# Patient Record
Sex: FEMALE | Race: WHITE | Employment: FULL TIME | URBAN - METROPOLITAN AREA
[De-identification: names, ages, dates, MRNs, and addresses within clinical notes are randomized per-mention and may not be internally consistent; named-entity substitution may affect disease eponyms.]

---

## 2021-06-29 LAB
HBA1C MFR BLD HPLC: 4.9 %
LDL-C, EXTERNAL: 124

## 2021-11-17 ENCOUNTER — HOSPITAL ENCOUNTER (EMERGENCY)
Age: 31
Discharge: HOME | End: 2021-11-17
Attending: STUDENT IN AN ORGANIZED HEALTH CARE EDUCATION/TRAINING PROGRAM
Payer: COMMERCIAL

## 2021-11-17 VITALS
WEIGHT: 130 LBS | OXYGEN SATURATION: 100 % | RESPIRATION RATE: 16 BRPM | HEART RATE: 93 BPM | TEMPERATURE: 98 F | BODY MASS INDEX: 19.7 KG/M2 | DIASTOLIC BLOOD PRESSURE: 72 MMHG | HEIGHT: 68 IN | SYSTOLIC BLOOD PRESSURE: 115 MMHG

## 2021-11-17 DIAGNOSIS — E16.1 REACTIVE HYPOGLYCEMIA: Primary | ICD-10-CM

## 2021-11-17 LAB
ANION GAP SERPL CALC-SCNC: 3 MMOL/L (ref 5–15)
BASOPHILS # BLD: 0 K/UL (ref 0–0.1)
BASOPHILS NFR BLD: 0 % (ref 0–1)
BUN SERPL-MCNC: 16 MG/DL (ref 6–20)
BUN/CREAT SERPL: 23 (ref 12–20)
CALCIUM SERPL-MCNC: 9.3 MG/DL (ref 8.5–10.1)
CHLORIDE SERPL-SCNC: 107 MMOL/L (ref 97–108)
CO2 SERPL-SCNC: 28 MMOL/L (ref 21–32)
COMMENT, HOLDF: NORMAL
CREAT SERPL-MCNC: 0.7 MG/DL (ref 0.55–1.02)
DIFFERENTIAL METHOD BLD: ABNORMAL
EOSINOPHIL # BLD: 0.1 K/UL (ref 0–0.4)
EOSINOPHIL NFR BLD: 1 % (ref 0–7)
ERYTHROCYTE [DISTWIDTH] IN BLOOD BY AUTOMATED COUNT: 11.1 % (ref 11.5–14.5)
GLUCOSE BLD STRIP.AUTO-MCNC: 109 MG/DL (ref 65–117)
GLUCOSE SERPL-MCNC: 118 MG/DL (ref 65–100)
HCT VFR BLD AUTO: 37.5 % (ref 35–47)
HGB BLD-MCNC: 12.9 G/DL (ref 11.5–16)
IMM GRANULOCYTES # BLD AUTO: 0 K/UL (ref 0–0.04)
IMM GRANULOCYTES NFR BLD AUTO: 0 % (ref 0–0.5)
LIPASE SERPL-CCNC: 126 U/L (ref 73–393)
LYMPHOCYTES # BLD: 1.4 K/UL (ref 0.8–3.5)
LYMPHOCYTES NFR BLD: 19 % (ref 12–49)
MCH RBC QN AUTO: 32.8 PG (ref 26–34)
MCHC RBC AUTO-ENTMCNC: 34.4 G/DL (ref 30–36.5)
MCV RBC AUTO: 95.4 FL (ref 80–99)
MONOCYTES # BLD: 0.7 K/UL (ref 0–1)
MONOCYTES NFR BLD: 9 % (ref 5–13)
NEUTS SEG # BLD: 5.2 K/UL (ref 1.8–8)
NEUTS SEG NFR BLD: 71 % (ref 32–75)
NRBC # BLD: 0 K/UL (ref 0–0.01)
NRBC BLD-RTO: 0 PER 100 WBC
PLATELET # BLD AUTO: 257 K/UL (ref 150–400)
PMV BLD AUTO: 10.3 FL (ref 8.9–12.9)
POTASSIUM SERPL-SCNC: 3.8 MMOL/L (ref 3.5–5.1)
RBC # BLD AUTO: 3.93 M/UL (ref 3.8–5.2)
SAMPLES BEING HELD,HOLD: NORMAL
SERVICE CMNT-IMP: NORMAL
SODIUM SERPL-SCNC: 138 MMOL/L (ref 136–145)
WBC # BLD AUTO: 7.4 K/UL (ref 3.6–11)

## 2021-11-17 PROCEDURE — 82962 GLUCOSE BLOOD TEST: CPT

## 2021-11-17 PROCEDURE — 80048 BASIC METABOLIC PNL TOTAL CA: CPT

## 2021-11-17 PROCEDURE — 36415 COLL VENOUS BLD VENIPUNCTURE: CPT

## 2021-11-17 PROCEDURE — 83690 ASSAY OF LIPASE: CPT

## 2021-11-17 PROCEDURE — 85025 COMPLETE CBC W/AUTO DIFF WBC: CPT

## 2021-11-17 PROCEDURE — 99284 EMERGENCY DEPT VISIT MOD MDM: CPT

## 2021-11-18 NOTE — ED PROVIDER NOTES
57-year-old female presents to the ED with hypoglycemia. She reports that she was recently diagnosed with reactive hypoglycemia by Dr. Eugenia Gomez, an endocrinologist.  He had previously been diagnosed with generalized anxiety and panic attacks, but her therapist had felt like there is something medical underlying going on and referred her to the endocrinologist who gave her this diagnosis. She reports her symptoms when her blood sugar is low present like anxiety, and teeth chattering. She was wearing a blood glucose monitor for a couple of months and it seemed that her blood sugars were stabilizing, but she had started feeling \"not right\" and recently decided to start wearing the monitor again. She reports that her blood sugars seem to be doing fine but last night her blood sugars were seeming to drop, staying near the 70s despite eating full meals. This continued into the night, and today where she reported she \"just did not feel right. Prior to coming into the ED, she reports her blood sugar had dropped into the 50s and did not seem to be responsive to eating peanut butter or honey. Shortly thereafter she felt \"my knees buckle\" and decided to come in to the ED. Denies any chest pain, shortness of breath, diaphoresis, vision changes, headache, dizziness, or syncope. No nausea vomiting or diarrhea. PMH: Reactive hypoglycemia  PSx: ear tubes  Social: no tobacco use, EtOH, or drug use    The history is provided by the patient.         Past Medical History:   Diagnosis Date    Depression     anxiety    Fracture of coccyx ([address]     (age 13)   [facility] Reactive hypoglycemia        Past Surgical History:   Procedure Laterality Date    HX HEENT      ear tubes (age 1)         Family History:   Problem Relation Age of Onset    Anxiety Sister     Anxiety Brother        Social History     Socioeconomic History    Marital status: SINGLE     Spouse name: Not on file    Number of children: Not on file    Years of education: Not on file    Highest education level: Not on file   Occupational History    Not on file   Tobacco Use    Smoking status: Never Smoker    Smokeless tobacco: Not on file   Substance and Sexual Activity    Alcohol use: No    Drug use: Not on file    Sexual activity: Not on file   Other Topics Concern    Not on file   Social History Narrative    Not on file     Social Determinants of Health     Financial Resource Strain:     Difficulty of Paying Living Expenses: Not on file   Food Insecurity:     Worried About Running Out of Food in the Last Year: Not on file    Pradeep of Food in the Last Year: Not on file   Transportation Needs:     Lack of Transportation (Medical): Not on file    Lack of Transportation (Non-Medical): Not on file   Physical Activity:     Days of Exercise per Week: Not on file    Minutes of Exercise per Session: Not on file   Stress:     Feeling of Stress : Not on file   Social Connections:     Frequency of Communication with Friends and Family: Not on file    Frequency of Social Gatherings with Friends and Family: Not on file    Attends Sabianist Services: Not on file    Active Member of [address] [place] [address] or Organizations: Not on file    Attends Club or Organization Meetings: Not on file    Marital Status: Not on file   Intimate Partner Violence:     Fear of Current or Ex-Partner: Not on file    Emotionally Abused: Not on file    Physically Abused: Not on file    Sexually Abused: Not on file   Housing Stability:     Unable to Pay for Housing in the Last Year: Not on file    Number of [place] in the Last Year: Not on file    Unstable Housing in the Last Year: Not on file         ALLERGIES: Patient has no known allergies. Review of Systems   Constitutional: Positive for chills. Negative for diaphoresis, fatigue and fever. HENT: Negative. Eyes: Negative. Respiratory: Negative. Cardiovascular: Negative. Gastrointestinal: Negative.     Endocrine: Negative. Genitourinary: Negative. Psychiatric/Behavioral: Negative. All other systems reviewed and are negative. Vitals:    11/17/21 1943   BP: 115/72   Pulse: 93   Resp: 16   Temp: 98 °F (36.7 °C)   SpO2: 100%   Weight: 59 kg (130 lb)   Height: 5' 8\" (1.727 m)            Physical Exam  Constitutional:       General: She is in acute distress (Hearing very anxious, shaking and teeth chattering. ). Appearance: Normal appearance. Cardiovascular:      Rate and Rhythm: Normal rate and regular rhythm. Pulses: Normal pulses. Heart sounds: Normal heart sounds. Pulmonary:      Effort: Pulmonary effort is normal.      Breath sounds: Normal breath sounds. Abdominal:      General: Abdomen is flat. Bowel sounds are normal.      Palpations: Abdomen is soft. Tenderness: There is no abdominal tenderness. Neurological:      Mental Status: She is alert. Psychiatric:         Mood and Affect: Affect normal. Mood is anxious. Speech: Speech normal.          MDM  Number of Diagnoses or Management Options  Diagnosis management comments: 68-year-old female presents to the ED with hypoglycemia. She reports that earlier at home her blood blood sugar had dropped into the 50s, but per EMS it was at 97 and POC for us was 103. She was eating [employer] in the waiting room and checked her blood sugar via her monitor, it was at 120. She still appeared very anxious, with shaking and teeth chattering, but refused any medications for her anxiety stating \"it will just make things worse\". Lab work while in ED was unremarkable, and patient reports feeling much better. Patient stable for discharge with instructions for follow up and conservative care at home.            Procedures

## 2021-11-18 NOTE — ED TRIAGE NOTES
Recently diagnosed with reactive hypoglycemia. BG 53 at home. States BG has been up and down all day. Felt weak and like her legs were buckling so called EMS. AOx4. BG 97 by EMS.

## 2021-11-18 NOTE — DISCHARGE INSTRUCTIONS
Labs and glucose looks stable here today. Continue to monitor symptoms and eat regular meals. Follow-up with your endocrinologist in the morning.

## 2021-12-03 LAB — CREATININE, EXTERNAL: 0.56

## 2022-07-15 ENCOUNTER — OFFICE VISIT (OUTPATIENT)
Dept: ENDOCRINOLOGY | Age: 32
End: 2022-07-15
Payer: COMMERCIAL

## 2022-07-15 VITALS
WEIGHT: 138.8 LBS | HEIGHT: 68 IN | HEART RATE: 99 BPM | BODY MASS INDEX: 21.04 KG/M2 | SYSTOLIC BLOOD PRESSURE: 136 MMHG | DIASTOLIC BLOOD PRESSURE: 100 MMHG

## 2022-07-15 DIAGNOSIS — E16.2 HYPOGLYCEMIA: Primary | ICD-10-CM

## 2022-07-15 PROCEDURE — 99203 OFFICE O/P NEW LOW 30 MIN: CPT | Performed by: INTERNAL MEDICINE

## 2022-07-15 RX ORDER — ACARBOSE 50 MG/1
TABLET ORAL
COMMUNITY
Start: 2022-06-13

## 2022-07-15 RX ORDER — BISMUTH SUBSALICYLATE 262 MG
1 TABLET,CHEWABLE ORAL DAILY
COMMUNITY

## 2022-07-15 RX ORDER — BLOOD SUGAR DIAGNOSTIC
STRIP MISCELLANEOUS
COMMUNITY
Start: 2022-05-13

## 2022-07-15 RX ORDER — MELATONIN
1000 DAILY
COMMUNITY

## 2022-07-15 RX ORDER — LANCETS 33 GAUGE
EACH MISCELLANEOUS
COMMUNITY
Start: 2022-04-12

## 2022-07-15 NOTE — PROGRESS NOTES
This is a 49-year-old woman referred for evaluation and management of hypoglycemia. The patient states that she was in her usual state of health until 2019. She relates the current symptoms to recurrent urinary tract infections in 2019 that resulted in multiple courses of antibiotics. After that, she was getting on a plane in [place] and had an episode of severe hypoglycemia. She has been troubled with hypoglycemic episodes ever since. She saw a therapist in [place]. She also saw nutritionist in [place] who put her on probiotics. She never got better. She continues to have episodes which will be described below. She then drove to [place] to be closer to family so that she might get help. Regarding diabetes, her uncle had diabetes. Her mother has diabetes. She has never been told that she has diabetes and she is never had diabetes with pregnancy. She tells me her weight has been stable. She previously was very physically active boxing 3-4 times a week for 60 minutes. But because of the spell she stopped working out. She has seen several endocrinologists MELINA Lewis. She was seen by Tatianna Saucedo in December 2021. She underwent an overnight fast.  At the completion of an 8-hour fast she presented to their office where she was found to have:  Glucose 83  C-peptide 2.0  Insulin 7.6  Cortisol 26.6  Glycohemoglobin 4.9    She saw Dr Alaina Maya at [facility] in March 2022. CGM Device Name/Type: Freestyle Manuel 2  Date range of recording : 3/1/22-3/28/22  Download dated): 3/28/22  Provider analysis and interpretation: Average glucose 92 with variability 12.7%; 98% in target range of ; 2% low. Occasional with glucose levels in upper 60s. Lowest glucose was 52 occurring overnight without clear symptoms. No significant hyperglycemia. She apparently had an episode in the office with a simultaneous glucose of 107. She was then placed on acarbose initially 25 mg which was increased to 50 mg. She has been on that pretty much ever since. She was on her freestyle everett for a while but it became anxiety provoking so she stopped taking it. She does check her blood sugar sporadically. She basically checks when she is beginning to feel symptoms and generally the blood sugars are in the 70-80 range. Her symptoms she describes as \"my teeth are chattering\". Sometimes she will get tachycardia but rarely. She does not give a history of typical hypoglycemic symptoms. Over the last several years she has taken small sips of juice which she describes micro boluses anytime she has the symptoms. She does have worsening episodes if she eats a higher carb amount and or a large amount of juice. So her diet is fairly low in carb. She has 2 eggs slice of whole-wheat toast and a salad for breakfast.  She snacks on cheese cottage cheese or almonds midmorning. Lunch can be chicken with a slice of bread and a salad. She has more almonds or chocolate in the afternoon. Dinner can be fish or chicken. Last night she had sushi without the rice she had soup and bread. She snacks on chocolate almonds and cheese at night. She denies chest pain, shortness of breath, constipation or diarrhea. Examination  Blood pressure 136/100  Pulse 100  Weight 138 (unchanged from June 2021)  BMI 21.1  HEENT unremarkable  Thyroid normal  The remainder the examination is unremarkable    Impression  1. Reactive hypoglycemia currently on acarbose 50 mg with most of her carbohydrate containing meals but without complete resolution of the symptoms and without weight gain  2. A 8-hour fast C-peptide of 2.0 with a glucose of 83    Plan:  1. After considerable discussion with the patient we agreed that on an agreed-upon day, she will do an overnight fast of at least 8 hours and present to the office.   We will get an insulin glucose and C-peptide level and she will stay at the office so that we can monitor her blood sugar and repeat her C-peptide and insulin 6 to 8 hours later to determine whether there is regulated or unregulated insulin secretion. ADDENDUM  Patient called on 7/16/2022 after an episode on airplane which resulted in the plane being brought back to gate from taxi. Unclear if she was experiencing low blood sugar. Will see her on Tuesday for prolonged fast.    7/19/2022  Pt presented at 0830 as requested. Last PO intake was 2330 on 7/18/2022  Labs drawn for glucose, insulin and C-peptide    Pt then continued the fast until 1400 (total fast 14.5 hrs) at which point labs were repeated. Primary sx hungry and teeth chattering. Results    Glucose Insulin  C-peptide  0820  90  8.9  2.0  1400  83  5.1  1.1    Impression. Normal response to fasting.

## 2022-07-19 DIAGNOSIS — E16.2 HYPOGLYCEMIA: Primary | ICD-10-CM

## 2022-07-25 ENCOUNTER — TELEPHONE (OUTPATIENT)
Dept: ENDOCRINOLOGY | Age: 32
End: 2022-07-25

## 2022-07-25 NOTE — TELEPHONE ENCOUNTER
I called the patient this morning and reviewed all of the laboratory data. I reassured her that she does not have fasting hyorglycemia. I also reassured her that I do not think that she has reactive/postprandial hypoglycemia as well. I did suggest to her that if any further evaluation is warranted, it might be an EEG to confirm the lack of any neurologic etiology for her symptoms.

## 2022-08-02 ENCOUNTER — DOCUMENTATION ONLY (OUTPATIENT)
Dept: ENDOCRINOLOGY | Age: 32
End: 2022-08-02

## 2022-08-02 NOTE — PROGRESS NOTES
I received laboratory data from [place] dated September 27, 2019  Hemoglobin A1c 4.7  TSH 1.6  Total testosterone 35.9  DHEA-sulfate 361  Random glucose 96

## 2022-11-09 ENCOUNTER — TELEPHONE (OUTPATIENT)
Dept: ENDOCRINOLOGY | Age: 32
End: 2022-11-09

## 2022-11-15 ENCOUNTER — OFFICE VISIT (OUTPATIENT)
Dept: ENDOCRINOLOGY | Age: 32
End: 2022-11-15
Payer: COMMERCIAL

## 2022-11-15 VITALS
HEIGHT: 68 IN | SYSTOLIC BLOOD PRESSURE: 114 MMHG | DIASTOLIC BLOOD PRESSURE: 78 MMHG | WEIGHT: 132 LBS | HEART RATE: 84 BPM | BODY MASS INDEX: 20 KG/M2

## 2022-11-15 DIAGNOSIS — E16.2 HYPOGLYCEMIA: Primary | ICD-10-CM

## 2022-11-15 PROCEDURE — 99214 OFFICE O/P EST MOD 30 MIN: CPT | Performed by: INTERNAL MEDICINE

## 2022-11-15 NOTE — PROGRESS NOTES
This is a 77-year-old woman referred for evaluation and management of hypoglycemia. The patient states that she was in her usual state of health until 2019. She relates the current symptoms to recurrent urinary tract infections in 2019 that resulted in multiple courses of antibiotics. After that, she was getting on a plane in [place] and had an episode of severe hypoglycemia. She has been troubled with hypoglycemic episodes ever since. She saw a therapist in [place]. She also saw nutritionist in [place] who put her on probiotics. She never got better. She continues to have episodes which will be described below. She then drove to [place] to be closer to family so that she might get help. Regarding diabetes, her uncle had diabetes. Her mother has diabetes. She has never been told that she has diabetes and she is never had diabetes with pregnancy. She tells me her weight has been stable. She previously was very physically active boxing 3-4 times a week for 60 minutes. But because of the spell she stopped working out. She has seen several endocrinologists in [place]. She was seen by Dr. Farhan Aragon in December 2021. She underwent an overnight fast.  At the completion of an 8-hour fast she presented to their office where she was found to have:  Glucose 83  C-peptide 2.0  Insulin 7.6  Cortisol 26.6  Glycohemoglobin 4.9     2. She saw Dr Te Carter at [facility] in March 2022. CGM Device Name/Type: Freestyle Manuel 2  Date range of recording : 3/1/22-3/28/22  Download dated): 3/28/22  Provider analysis and interpretation: Average glucose 92 with variability 12.7%; 98% in target range of ; 2% low. Occasional with glucose levels in upper 60s. Lowest glucose was 52 occurring overnight without clear symptoms. No significant hyperglycemia. She apparently had an episode in the office with a simultaneous glucose of 107.    She was then placed on acarbose initially 25 mg which was increased to 50 mg. She has been on that pretty much ever since. She was on her freestyle everett for a while but it became anxiety provoking so she stopped taking it. She does check her blood sugar sporadically. She basically checks when she is beginning to feel symptoms and generally the blood sugars are in the 70-80 range. Her symptoms she describes as \"my teeth are chattering\". Sometimes she will get tachycardia but rarely. She does not give a history of typical hypoglycemic symptoms. Over the last several years she has taken small sips of juice which she describes micro boluses anytime she has the symptoms. She does have worsening episodes if she eats a higher carb amount and or a large amount of juice. So her diet is fairly low in carb. Our evaluation has included a prolonged fast  to establish normal fasting physiology and then a mixed meal tolerance test:    7/19/2022  Pt presented at 0830 as requested. Last PO intake was 2330 on 7/18/2022  Labs drawn for glucose, insulin and C-peptide     Pt then continued the fast until 1400 (total fast 14.5 hrs) at which point labs were repeated. Primary sx hungry and teeth chattering. Glucose           Insulin              C-peptide  0820                90                    8.9                   2.0  1400                83                    5.1                   1.1    11/15/2022  She now presents for a mixed meal tolerance test to rule out post-prandial hypoglycemia. She tells me today that her last meal was at 6:00 PM on 11/14/2022 and she denies other caloric intake since then. Fasting labs for glucose and insulin were drawn and the patient ingested an 8 oz 20 gm protein Boost (28 gm carb, 6 gm fat, 20 gm protein).  Sequential hourly labs for glucose and insulin were drawn over 3 hours, the results of which are below:    Glucose Insulin  0  90  7.6  1 hr  106  60.4  2 hr  80  4.3  3 hr  69  9.6  Impression  Possible reactive hypoglycemia with MTT suggesting normal physiology   Plan  Spoke to patient and told her that I did not think she needed acarbose and that gradual reintroduction of carbs would likely improve things further  Followup as indicated

## 2023-01-23 ENCOUNTER — HOSPITAL ENCOUNTER (EMERGENCY)
Age: 33
Discharge: HOME | End: 2023-01-24
Attending: STUDENT IN AN ORGANIZED HEALTH CARE EDUCATION/TRAINING PROGRAM
Payer: COMMERCIAL

## 2023-01-23 VITALS
OXYGEN SATURATION: 100 % | DIASTOLIC BLOOD PRESSURE: 64 MMHG | HEART RATE: 83 BPM | RESPIRATION RATE: 18 BRPM | TEMPERATURE: 98 F | SYSTOLIC BLOOD PRESSURE: 115 MMHG

## 2023-01-23 DIAGNOSIS — J06.9 ACUTE UPPER RESPIRATORY INFECTION: ICD-10-CM

## 2023-01-23 DIAGNOSIS — F41.0 PANIC ATTACK: Primary | ICD-10-CM

## 2023-01-23 PROCEDURE — 99282 EMERGENCY DEPT VISIT SF MDM: CPT

## 2023-01-23 NOTE — Clinical Note
[address]  [address] [facility] [address]  [phone]    Work/School Note    Date: 1/23/2023    To Whom It May concern:    Linda Mckeon was seen and treated today in the emergency room by the following provider(s):  Attending Provider: Dylan Hughes MD  Nurse Practitioner: Kelly Winters NP. Linda Mckeon is excused from work/school on 01/24/23 and 01/25/23. She is medically clear to return to work/school on 1/26/2023.        Sincerely,          Baron Ramey NP

## 2023-01-24 NOTE — ED PROVIDER NOTES
HPI   Shanice Wills is a 27 y.o. female with Hx of hypoglycemia, depression, anxiety, panic attack who presents ambulatory w/ SO to [facility] [place] ED with cc of panic attack. Patient reports that she has experienced flulike symptoms  (cough, rhinorrhea, congestion, body aches, fevers) over the last few days. SO (+) for flu (-) COVID. Woke up  tonight and felt like she could not catch her breath which made her have a panic attack. Attempted to obtain a pulse ox reading and was unable to do so so she became more anxious and came to the emergency department. Patient states that her symptoms have actually improved at this time and resolved and she is requesting discharge home. Denies chills, N/V/D, urinary or bowel habit concerns. Is currently on the phone with her therapist. No SI/ HI. Denies tobacco/ ETOH/ substance abuse. PCP: None    There are no other complaints, changes or physical findings at this time.       Past Medical History:   Diagnosis Date    Depression     anxiety    Fracture of coccyx ([address]     (age 13)    Reactive hypoglycemia        Past Surgical History:   Procedure Laterality Date    HX HEENT      ear tubes (age 1)         Family History:   Problem Relation Age of Onset    Anxiety Sister     Anxiety Brother        Social History     Socioeconomic History    Marital status: SINGLE     Spouse name: Not on file    Number of children: Not on file    Years of education: Not on file    Highest education level: Not on file   Occupational History    Not on file   Tobacco Use    Smoking status: Never    Smokeless tobacco: Never   Substance and Sexual Activity    Alcohol use: No    Drug use: Not on file    Sexual activity: Not on file   Other Topics Concern    Not on file   Social History Narrative    Not on file     Social Determinants of Health     Financial Resource Strain: Not on file   Food Insecurity: Not on file   Transportation Needs: Not on file   Physical Activity: Not on file   Stress: Not on file   Social Connections: Not on file   Intimate Partner Violence: Not on file   Housing Stability: Not on file         ALLERGIES: Patient has no known allergies. Review of Systems   Constitutional:  Positive for activity change, chills and fever. Negative for appetite change. HENT:  Positive for congestion, rhinorrhea and sore throat. Eyes:  Negative for visual disturbance. Respiratory:  Negative for cough and shortness of breath. Cardiovascular:  Negative for chest pain. Gastrointestinal:  Negative for abdominal pain, diarrhea, nausea and vomiting. Genitourinary:  Negative for dysuria, flank pain, frequency and urgency. Musculoskeletal:  Negative for arthralgias, back pain and neck pain. Skin:  Negative for color change and rash. Neurological:  Negative for dizziness, speech difficulty, weakness, light-headedness, numbness and headaches. Psychiatric/Behavioral:  Positive for dysphoric mood and sleep disturbance. Negative for agitation, behavioral problems and confusion. The patient is nervous/anxious and is hyperactive. All other systems reviewed and are negative. Vitals:    01/23/23 2336   BP: 115/64   Pulse: 83   Resp: 18   Temp: 98 °F (36.7 °C)   SpO2: 100%            Physical Exam  Vitals and nursing note reviewed. Constitutional:       General: She is not in acute distress. Appearance: She is well-developed. HENT:      Head: Normocephalic and atraumatic. Right Ear: External ear normal.      Left Ear: External ear normal.      Nose: Congestion and rhinorrhea present. Eyes:      Conjunctiva/sclera: Conjunctivae normal.      Pupils: Pupils are equal, round, and reactive to light. Cardiovascular:      Rate and Rhythm: Normal rate and regular rhythm. Heart sounds: Normal heart sounds. Pulmonary:      Effort: Pulmonary effort is normal.      Breath sounds: Normal breath sounds. Musculoskeletal:         General: Normal range of motion.       Cervical back: Normal range of motion and neck supple. Skin:     General: Skin is warm and dry. Neurological:      Mental Status: She is alert and oriented to person, place, and time. Psychiatric:         Behavior: Behavior normal.         Thought Content: Thought content normal.         Judgment: Judgment normal.        Medical Decision Making  Differential diagnosis includes panic disorder, anxiety disorder, mood disorder    Patient presents to the emergency department with concern for resolved panic attack. She does not have any suicidal or homicidal ideations, declined speaking with [facility], and is requesting discharge. She has normal vital signs. We discussed management of upper respiratory infections. Patient has adequate follow-up with her therapist.  Reasons to return to the ER were provided and reviewed with discharge paperwork. Procedures    LABORATORY TESTS:  No results found for this or any previous visit (from the past 12 hour(s)). IMAGING RESULTS:  No orders to display       MEDICATIONS GIVEN:  Medications - No data to display    IMPRESSION:  1. Panic attack    2. Acute upper respiratory infection        PLAN:  1. Discharge Medication List as of 1/24/2023 12:52 AM        2. Follow-up Information       Follow up With Specialties Details Why Contact [address] [facility] Emergency Medicine Go to  As needed, If symptoms worsen [address] [facility]  [phone]          3. Return to ED if worse     Please note that this dictation was completed with [employer], the computer voice recognition software. Quite often unanticipated grammatical, syntax, homophones, and other interpretive errors are inadvertently transcribed by the computer software. Please disregard these errors. Please excuse any errors that have escaped final proofreading.

## 2023-01-24 NOTE — ED TRIAGE NOTES
Pt arrives with cc of a panic attack after having flu symptoms. Her  dx with flu and she became symptomatic on Thursday. She has phobia of medications. Her home pulse ox could not  her ruddy and that threw her into a spiral making it more difficult to breathe.      VSS in triage

## 2023-01-24 NOTE — [facility] NOTE
[facility] Consult was place for pt who requested resources for anxiety symptoms. Writer attempted to complete consult with Pt in [address] [place] [address] before Pt stated \"I specifically asked to not see a counselor and am confused as to why you are here \". Pt denied SI/HI and disclosed having sought treatment in ED for a panic attack and shortness of breathe. Pt reported to have had boyfriend who was present in the waiting room use a pulse ox to assist with taker her vital which she reported \"It was fucked up and didn't work\". Pt informed writer that she was feeling better and talking with her therapist over txt/call during this time. Pt declined to speak further to writer and requested to wait in waiting room for discharge paper work. Writer informed RN, Ginette Canas who pt expressed desire to leave and ED, Provider Dylan Burton. Writer encourage pt to return if needed.

## 2023-05-01 ENCOUNTER — OFFICE VISIT (OUTPATIENT)
Dept: ENDOCRINOLOGY | Age: 33
End: 2023-05-01
Payer: COMMERCIAL

## 2023-05-01 VITALS
SYSTOLIC BLOOD PRESSURE: 120 MMHG | WEIGHT: 130 LBS | HEIGHT: 68 IN | BODY MASS INDEX: 19.7 KG/M2 | DIASTOLIC BLOOD PRESSURE: 75 MMHG | HEART RATE: 68 BPM

## 2023-05-01 DIAGNOSIS — E16.2 HYPOGLYCEMIA: Primary | ICD-10-CM

## 2023-05-01 LAB
ABO, EXTERNAL RESULT: NORMAL
C. TRACHOMATIS, EXTERNAL RESULT: NEGATIVE
HEP B, EXTERNAL RESULT: NEGATIVE
HEPATITIS C ANTIBODY, EXTERNAL RESULT: NORMAL
HIV, EXTERNAL RESULT: NORMAL
N. GONORRHOEAE, EXTERNAL RESULT: NEGATIVE
RH FACTOR, EXTERNAL RESULT: NEGATIVE
RH FACTOR, EXTERNAL RESULT: POSITIVE
RH FACTOR, EXTERNAL RESULT: POSITIVE
RPR, EXTERNAL RESULT: NORMAL
RUBELLA TITER, EXTERNAL RESULT: NORMAL

## 2023-05-01 PROCEDURE — 99213 OFFICE O/P EST LOW 20 MIN: CPT | Performed by: INTERNAL MEDICINE

## 2023-05-01 NOTE — PROGRESS NOTES
This is a 25-year-old woman referred for evaluation and management of hypoglycemia. The patient states that she was in her usual state of health until 2019. She relates the current symptoms to recurrent urinary tract infections in 2019 that resulted in multiple courses of antibiotics. After that, she was getting on a plane in [place] and had an episode of severe hypoglycemia. She has been troubled with hypoglycemic episodes ever since. She saw a therapist in [place]. She also saw nutritionist in [place] who put her on probiotics. She never got better. She continues to have episodes which will be described below. She then drove to [place] to be closer to family so that she might get help. Regarding family history of diabetes, her uncle had diabetes. Her mother has diabetes. She has never been told that she has diabetes and she has never had diabetes with pregnancy. She tells me her weight has been stable. However she weighed 141 pounds in March 2022. She previously was very physically active boxing 3-4 times a week for 60 minutes. But because of the spell she stopped working out. She has seen several endocrinologists in [place]. She was seen by Dr. Sunita Fried in December 2021. She underwent an overnight fast.  At the completion of an 8-hour fast she presented to their office where she was found to have:  Glucose 83  C-peptide 2.0  Insulin 7.6  Cortisol 26.6  Glycohemoglobin 4.9     2. She saw Dr Cody Hahn at [facility] in March 2022. CGM Device Name/Type: Freestyle Manuel 2  Date range of recording : 3/1/22-3/28/22  Download dated): 3/28/22  Provider analysis and interpretation: Average glucose 92 with variability 12.7%; 98% in target range of ; 2% low. Occasional with glucose levels in upper 60s. Lowest glucose was 52 occurring overnight without clear symptoms. No significant hyperglycemia. She apparently had an episode in the office with a simultaneous glucose of 107.    She was then placed on acarbose initially 25 mg which was increased to 50 mg. She has been on that pretty much ever since. She was on her freestyle everett for a while but it became anxiety provoking so she stopped taking it. She does check her blood sugar sporadically. She basically checks when she is beginning to feel symptoms and generally the blood sugars are in the 70-80 range. Her symptoms she describes as \"my teeth are chattering\". Sometimes she will get tachycardia but rarely. She does not give a history of typical hypoglycemic symptoms. Over the last several years she has taken small sips of juice which she describes micro boluses anytime she has the symptoms. She does have worsening episodes if she eats a higher carb amount and or a large amount of juice. So her diet is fairly low in carb. Our evaluation has included a prolonged fast  to establish normal fasting physiology and then a mixed meal tolerance test:     7/19/2022  Pt presented at 0830 as requested. Last PO intake was 2330 on 7/18/2022  Labs drawn for glucose, insulin and C-peptide     Pt then continued the fast until 1400 (total fast 14.5 hrs) at which point labs were repeated. Primary sx hungry and teeth chattering. Glucose           Insulin              C-peptide  0820                90                    8.9                   2.0  1400                83                    5.1                   1.1     11/15/2022  She now presents for a mixed meal tolerance test to rule out post-prandial hypoglycemia. She tells me today that her last meal was at 6:00 PM on 11/14/2022 and she denies other caloric intake since then. Fasting labs for glucose and insulin were drawn and the patient ingested an 8 oz 20 gm protein Boost (28 gm carb, 6 gm fat, 20 gm protein).  Sequential hourly labs for glucose and insulin were drawn over 3 hours, the results of which are below:                          Glucose Insulin  0                      90                    7.6  1 hr                  106                  60.4  2 hr                  80                    4.3  3 hr                  69                    9.6    She presents today for follow-up. She has been doing very well since I saw her last.  She has not been taking the Acarbose  and is very pleased about that. She has not had any episodes of what she had thought was hypoglycemia. She had a few episodes of \"teeth chattering\" but she now wonders whether this is related to pregnancy. She has had a positive pregnancy test and is being evaluated today for that. She was was initially diagnosed with miscarriage about a month ago but now has a positive HCG (52K) and scheduled for ultrasound today. She does tell me that her diet is fairly low in carbohydrate. Breakfast can be eggs toast and yogurt. Lunch is salad with protein. Dinner is vegetables and protein. Examination  Blood pressure 120/75  Pulse 80 and regular  Weight 130 (10 pounds down from 2022)  BMI 19.8    Impression  1. Possible postprandial hypoglycemia with an evaluation that is essentially unremarkable. It does appear based on her diet history, that she is avoiding carbohydrate in the most part. However she is off acarbose which she determined was not helping her symptoms. 2.  Possible pregnancy (? Ectopic)    Plan:  1. At her request I did get a CMP and lipid panel  2. Follow-up will be on an as-needed basis.

## 2023-06-09 ENCOUNTER — INITIAL PRENATAL (OUTPATIENT)
Age: 33
End: 2023-06-09

## 2023-06-09 VITALS — WEIGHT: 132 LBS | BODY MASS INDEX: 20.07 KG/M2 | SYSTOLIC BLOOD PRESSURE: 100 MMHG | DIASTOLIC BLOOD PRESSURE: 60 MMHG

## 2023-06-09 DIAGNOSIS — Z3A.14 14 WEEKS GESTATION OF PREGNANCY: ICD-10-CM

## 2023-06-09 DIAGNOSIS — O09.32 INITIAL OBSTETRIC VISIT IN SECOND TRIMESTER: Primary | ICD-10-CM

## 2023-06-09 NOTE — PROGRESS NOTES
reviewed; call coverage reviewed and welcoming folder reviewed. All questions have been answered. Patient declines presence of chaperone during today's visit. Handouts given to pt.     Félix Negro RN

## 2023-07-05 ENCOUNTER — ROUTINE PRENATAL (OUTPATIENT)
Age: 33
End: 2023-07-05

## 2023-07-05 VITALS — BODY MASS INDEX: 21.04 KG/M2 | SYSTOLIC BLOOD PRESSURE: 102 MMHG | DIASTOLIC BLOOD PRESSURE: 60 MMHG | WEIGHT: 138.4 LBS

## 2023-07-05 DIAGNOSIS — Z3A.14 14 WEEKS GESTATION OF PREGNANCY: Primary | ICD-10-CM

## 2023-07-05 PROCEDURE — 0502F SUBSEQUENT PRENATAL CARE: CPT | Performed by: OBSTETRICS & GYNECOLOGY

## 2023-07-05 NOTE — PROGRESS NOTES
FAS scheduled 8/2  Increased stress-- concerned about baby being negatively impacted; tearful; signif other present; she'll not make eye contact w him  Met with new pcp- wanted patient to mention family h/o of Hypothyroidism and HTN (patient's mom).   Asymptomatic at this time

## 2023-08-02 ENCOUNTER — ROUTINE PRENATAL (OUTPATIENT)
Age: 33
End: 2023-08-02

## 2023-08-02 VITALS — DIASTOLIC BLOOD PRESSURE: 60 MMHG | SYSTOLIC BLOOD PRESSURE: 102 MMHG | BODY MASS INDEX: 22.05 KG/M2 | WEIGHT: 145 LBS

## 2023-08-02 DIAGNOSIS — Z3A.14 14 WEEKS GESTATION OF PREGNANCY: Primary | ICD-10-CM

## 2023-08-02 PROCEDURE — 0502F SUBSEQUENT PRENATAL CARE: CPT | Performed by: OBSTETRICS & GYNECOLOGY

## 2023-08-02 NOTE — PROGRESS NOTES
C/o some hot flashes and occasional insomnia. +fm  Reviewed US    FETAL SURVEY  A SINGLE VIABLE IUP AT 22W3D IS SEEN. FETAL CARDIAC MOTION OBSERVED. FETAL ANATOMY WAS WELL VISUALIZED. THERE APPEARS TO BE AN ICEF WITHIN THE LEFT VENTRICLE OF   FETAL HEART. APPROPRIATE GROWTH MEASURED. SIZE = DATES. WENDY, PLACENTA AND CERVIX APPEAR WITHIN NORMAL LIMITS. GENDER: MALE.

## 2023-08-25 ENCOUNTER — TELEPHONE (OUTPATIENT)
Age: 33
End: 2023-08-25

## 2023-08-25 ENCOUNTER — OFFICE VISIT (OUTPATIENT)
Facility: HOSPITAL | Age: 33
End: 2023-08-25
Attending: EMERGENCY MEDICINE
Payer: COMMERCIAL

## 2023-08-25 ENCOUNTER — HOSPITAL ENCOUNTER (EMERGENCY)
Facility: HOSPITAL | Age: 33
Discharge: HOME | End: 2023-08-25
Attending: EMERGENCY MEDICINE
Payer: COMMERCIAL

## 2023-08-25 VITALS
RESPIRATION RATE: 18 BRPM | OXYGEN SATURATION: 99 % | SYSTOLIC BLOOD PRESSURE: 126 MMHG | TEMPERATURE: 97.8 F | HEART RATE: 69 BPM | DIASTOLIC BLOOD PRESSURE: 80 MMHG

## 2023-08-25 DIAGNOSIS — M54.9 ACUTE UPPER BACK PAIN: Primary | ICD-10-CM

## 2023-08-25 DIAGNOSIS — R10.9 LEFT SIDED ABDOMINAL PAIN: ICD-10-CM

## 2023-08-25 LAB
APPEARANCE UR: CLEAR
BACTERIA URNS QL MICRO: ABNORMAL /HPF
BILIRUB UR QL: NEGATIVE
COLOR UR: ABNORMAL
EPITH CASTS URNS QL MICRO: ABNORMAL /LPF
GLUCOSE UR STRIP.AUTO-MCNC: NEGATIVE MG/DL
HGB UR QL STRIP: NEGATIVE
HYALINE CASTS URNS QL MICRO: ABNORMAL /LPF (ref 0–5)
KETONES UR QL STRIP.AUTO: NEGATIVE MG/DL
LEUKOCYTE ESTERASE UR QL STRIP.AUTO: NEGATIVE
NITRITE UR QL STRIP.AUTO: NEGATIVE
PH UR STRIP: 6.5 (ref 5–8)
PROT UR STRIP-MCNC: NEGATIVE MG/DL
RBC #/AREA URNS HPF: ABNORMAL /HPF (ref 0–5)
SP GR UR REFRACTOMETRY: 1.01 (ref 1–1.03)
SPECIMEN HOLD: NORMAL
UROBILINOGEN UR QL STRIP.AUTO: 0.2 EU/DL (ref 0.2–1)
WBC URNS QL MICRO: ABNORMAL /HPF (ref 0–4)

## 2023-08-25 PROCEDURE — 99284 EMERGENCY DEPT VISIT MOD MDM: CPT

## 2023-08-25 PROCEDURE — 81001 URINALYSIS AUTO W/SCOPE: CPT

## 2023-08-25 PROCEDURE — 87086 URINE CULTURE/COLONY COUNT: CPT

## 2023-08-25 RX ORDER — ACETAMINOPHEN 500 MG
1000 TABLET ORAL
Status: DISCONTINUED | OUTPATIENT
Start: 2023-08-25 | End: 2023-08-25 | Stop reason: HOSPADM

## 2023-08-25 RX ORDER — ACETAMINOPHEN 500 MG
1000 TABLET ORAL EVERY 6 HOURS PRN
Qty: 40 TABLET | Refills: 0 | Status: SHIPPED | OUTPATIENT
Start: 2023-08-25 | End: 2023-08-30 | Stop reason: ALTCHOICE

## 2023-08-25 ASSESSMENT — PAIN DESCRIPTION - LOCATION: LOCATION: ABDOMEN

## 2023-08-25 ASSESSMENT — PAIN SCALES - GENERAL: PAINLEVEL_OUTOF10: 2

## 2023-08-25 ASSESSMENT — PAIN DESCRIPTION - DESCRIPTORS: DESCRIPTORS: ACHING

## 2023-08-25 ASSESSMENT — PAIN DESCRIPTION - ORIENTATION: ORIENTATION: LEFT;LOWER

## 2023-08-25 ASSESSMENT — PAIN - FUNCTIONAL ASSESSMENT: PAIN_FUNCTIONAL_ASSESSMENT: 0-10

## 2023-08-25 NOTE — TELEPHONE ENCOUNTER
Patient calling name and  verified patient is  25 weeks 5 days gestation. Patient stating she went to er last night see er notes. She went to the er for pain that radiated from the top of her back that she rated last night 7/10. She states she has no pain right now baby is moving but wanted you to see notes she left before she got dx.

## 2023-08-25 NOTE — ED PROVIDER NOTES
[facility] EMERGENCY DEP  EMERGENCY DEPARTMENT ENCOUNTER      Pt Name: Kirstin Segundo  MRN: [id number]  [address] [facility] [place] 1990  Date of evaluation: 8/25/2023  Provider: Negrita Box MD    CHIEF COMPLAINT       Chief Complaint   Patient presents with    Back Pain    Abdominal Pain         HISTORY OF PRESENT ILLNESS    58-year-old female who is currently 25 weeks pregnant presents with upper back pain between her shoulder blades that radiated to both sides that she woke up with from sleep. She later developed some left lower abdominal pain that was sharp in nature. Back pain has subsided but still has lower abdominal discomfort. No urinary symptoms except has been slightly cloudy. Review of External Medical Records:     Nursing Notes were reviewed. REVIEW OF SYSTEMS       Review of Systems    Except as noted above the remainder of the review of systems was reviewed and negative. PAST MEDICAL HISTORY     Past Medical History:   Diagnosis Date    Depression     anxiety    Fracture of coccyx ([address])     (age 13)    Reactive hypoglycemia          SURGICAL HISTORY       Past Surgical History:   Procedure Laterality Date    COLPOSCOPY  2018?     HEENT      ear tubes (age 1)         CURRENT MEDICATIONS       Previous Medications    PRENATAL VIT-FE FUMARATE-FA (PRENATAL VITAMIN PO)    Take by mouth       ALLERGIES     Amaranth (fd&c red #2), Sesame oil, and Rice    FAMILY HISTORY       Family History   Problem Relation Age of Onset    Anxiety Disorder Sister     Anxiety Disorder Brother     Breast Cancer Mother     Diabetes Mother     Stroke Mother           SOCIAL HISTORY       Social History     Socioeconomic History    Marital status: Single   Tobacco Use    Smoking status: Never    Smokeless tobacco: Never   Vaping Use    Vaping Use: Never used   Substance and Sexual Activity    Alcohol use: Not Currently    Drug use: Not Currently    Sexual activity: Yes     Partners: Male     Birth control/protection:

## 2023-08-25 NOTE — TELEPHONE ENCOUNTER
Called patient verified name and . Patient advised per below      Hailee Monzon MD  to Me    AH     2:32 PM  Please share w pt that i've reviewed notes from ER visit and I agree that pain likely musculoskeletal in nature.   If she now feels ok with fetal movement, just take it easy over the weekend and update if further concerns     Patient verbalized understanding

## 2023-08-25 NOTE — ED TRIAGE NOTES
Pt arrives ambulatory to triage from home w/ complaint of RLQ pain/ sudden onset back pain that has now resolved. Pt states the back pain awoke her from sleep. Pt denies changes to bladder habits however endorses constipation.  Pt is currently 25 weeks pregnant and sees Dr. Harriett Reeves for [facility]

## 2023-08-26 LAB
BACTERIA SPEC CULT: NORMAL
SERVICE CMNT-IMP: NORMAL

## 2023-08-30 ENCOUNTER — ROUTINE PRENATAL (OUTPATIENT)
Age: 33
End: 2023-08-30

## 2023-08-30 VITALS — DIASTOLIC BLOOD PRESSURE: 78 MMHG | WEIGHT: 149 LBS | BODY MASS INDEX: 22.66 KG/M2 | SYSTOLIC BLOOD PRESSURE: 122 MMHG

## 2023-08-30 DIAGNOSIS — Z34.90 ENCOUNTER FOR PRENATAL CARE: ICD-10-CM

## 2023-08-30 DIAGNOSIS — Z3A.14 14 WEEKS GESTATION OF PREGNANCY: Primary | ICD-10-CM

## 2023-08-30 PROCEDURE — 0502F SUBSEQUENT PRENATAL CARE: CPT | Performed by: OBSTETRICS & GYNECOLOGY

## 2023-08-30 NOTE — PROGRESS NOTES
Pt c/o feeling more emotional.  Not interested in medication at this time.   Lengthy conversation w pt, mom and ; poor eye contact; states no wish to harm self or baby; currently has fired 3rd therapist

## 2023-09-27 ENCOUNTER — ROUTINE PRENATAL (OUTPATIENT)
Age: 33
End: 2023-09-27

## 2023-09-27 VITALS — SYSTOLIC BLOOD PRESSURE: 122 MMHG | WEIGHT: 159 LBS | DIASTOLIC BLOOD PRESSURE: 82 MMHG | BODY MASS INDEX: 24.18 KG/M2

## 2023-09-27 DIAGNOSIS — Z3A.14 14 WEEKS GESTATION OF PREGNANCY: Primary | ICD-10-CM

## 2023-09-27 LAB
ABO + RH BLD: NORMAL
BLOOD BANK CMNT PATIENT-IMP: NORMAL
BLOOD GROUP ANTIBODIES SERPL: NORMAL
ERYTHROCYTE [DISTWIDTH] IN BLOOD BY AUTOMATED COUNT: 11.8 % (ref 11.5–14.5)
HCT VFR BLD AUTO: 34.1 % (ref 35–47)
HGB BLD-MCNC: 11.2 G/DL (ref 11.5–16)
MCH RBC QN AUTO: 32.4 PG (ref 26–34)
MCHC RBC AUTO-ENTMCNC: 32.8 G/DL (ref 30–36.5)
MCV RBC AUTO: 98.6 FL (ref 80–99)
NRBC # BLD: 0 K/UL (ref 0–0.01)
NRBC BLD-RTO: 0 PER 100 WBC
PLATELET # BLD AUTO: 243 K/UL (ref 150–400)
PMV BLD AUTO: 10.4 FL (ref 8.9–12.9)
RBC # BLD AUTO: 3.46 M/UL (ref 3.8–5.2)
SPECIMEN EXP DATE BLD: NORMAL
WBC # BLD AUTO: 8.1 K/UL (ref 3.6–11)

## 2023-09-27 PROCEDURE — 0502F SUBSEQUENT PRENATAL CARE: CPT | Performed by: OBSTETRICS & GYNECOLOGY

## 2023-09-27 RX ORDER — BLOOD-GLUCOSE METER
1 KIT MISCELLANEOUS DAILY
Qty: 1 KIT | Refills: 0 | Status: SHIPPED | OUTPATIENT
Start: 2023-09-27 | End: 2023-09-27 | Stop reason: ALTCHOICE

## 2023-09-27 RX ORDER — LANCETS 30 GAUGE
EACH MISCELLANEOUS
Qty: 100 EACH | Refills: 0 | Status: SHIPPED | OUTPATIENT
Start: 2023-09-27 | End: 2023-09-27 | Stop reason: ALTCHOICE

## 2023-09-27 RX ORDER — CONTAINER,EMPTY
1 EACH MISCELLANEOUS PRN
Qty: 1 EACH | Refills: 0 | Status: SHIPPED | OUTPATIENT
Start: 2023-09-27 | End: 2023-09-27 | Stop reason: ALTCHOICE

## 2023-09-27 RX ORDER — GLUCOSAMINE HCL/CHONDROITIN SU 500-400 MG
CAPSULE ORAL
Qty: 200 STRIP | Refills: 1 | Status: SHIPPED | OUTPATIENT
Start: 2023-09-27 | End: 2023-09-27 | Stop reason: ALTCHOICE

## 2023-09-29 LAB — VZV IGG SER IA-ACNC: 234 INDEX

## 2023-09-30 LAB
C TRACH RRNA SPEC QL NAA+PROBE: NEGATIVE
N GONORRHOEA RRNA SPEC QL NAA+PROBE: NEGATIVE
T VAGINALIS RRNA SPEC QL NAA+PROBE: NEGATIVE

## 2023-10-02 LAB
HGB A MFR BLD: 97.5 % (ref 96.4–98.8)
HGB A2 MFR BLD COLUMN CHROM: 2.5 % (ref 1.8–3.2)
HGB F MFR BLD: 0 % (ref 0–2)
HGB FRACT BLD-IMP: NORMAL
HGB S MFR BLD: 0 %

## 2023-10-09 ENCOUNTER — ROUTINE PRENATAL (OUTPATIENT)
Age: 33
End: 2023-10-09

## 2023-10-09 ENCOUNTER — TELEPHONE (OUTPATIENT)
Age: 33
End: 2023-10-09

## 2023-10-09 VITALS — SYSTOLIC BLOOD PRESSURE: 130 MMHG | WEIGHT: 161 LBS | BODY MASS INDEX: 24.48 KG/M2 | DIASTOLIC BLOOD PRESSURE: 82 MMHG

## 2023-10-09 DIAGNOSIS — Z3A.14 14 WEEKS GESTATION OF PREGNANCY: ICD-10-CM

## 2023-10-09 PROCEDURE — 0502F SUBSEQUENT PRENATAL CARE: CPT | Performed by: OBSTETRICS & GYNECOLOGY

## 2023-10-09 NOTE — PROGRESS NOTES
Patient states she has a bp of 100/50 this AM.  Over the weekend, she had periods of shakiness. History of low blood sugar; has not checked value. She is trying to eat well rounded meals.  She states that today she is feeling better  Very stressful week; moving; family wedding and sick dog; knows to eat protein at meals and snacks

## 2023-10-09 NOTE — TELEPHONE ENCOUNTER
At request of on call MD, called patient to schedule same day appointment. Patient states she has a bp of 100/50 this AM.  Over the weekend, she had periods of shakiness. History of low blood sugar; has not checked value. She is trying to eat well rounded meals. She states that today she is feeling better, but has a lot of vague symptoms she wishes to discuss with Dr. Brandy Ruiz. Appointment made for 1:50 today. Advised patient to call our office if symptoms worsened.

## 2023-10-23 ENCOUNTER — ROUTINE PRENATAL (OUTPATIENT)
Age: 33
End: 2023-10-23

## 2023-10-23 VITALS — DIASTOLIC BLOOD PRESSURE: 62 MMHG | WEIGHT: 162 LBS | SYSTOLIC BLOOD PRESSURE: 110 MMHG | BODY MASS INDEX: 24.63 KG/M2

## 2023-10-23 DIAGNOSIS — Z34.80 ENCOUNTER FOR SUPERVISION OF OTHER NORMAL PREGNANCY, UNSPECIFIED TRIMESTER: Primary | ICD-10-CM

## 2023-10-23 DIAGNOSIS — Z3A.14 14 WEEKS GESTATION OF PREGNANCY: ICD-10-CM

## 2023-10-23 PROCEDURE — 0502F SUBSEQUENT PRENATAL CARE: CPT | Performed by: OBSTETRICS & GYNECOLOGY

## 2023-10-30 ENCOUNTER — TELEPHONE (OUTPATIENT)
Age: 33
End: 2023-10-30

## 2023-10-30 NOTE — TELEPHONE ENCOUNTER
I have called and spoken with the pt to inform her of the message from the provider. She has expressed understanding and will send us the results of [facility]

## 2023-10-30 NOTE — TELEPHONE ENCOUNTER
Patient called, name and  verified. Patient is calling c/o some swelling while traveling from her baby moon to [place]. Pt has been informed that some extremity swelling is normal. She has been advised to take a garland aspirin but she has informed met hat she has a phobia of taking medications. She is trying to to elevate them but does not have any compression socks. IS there anything else you would like for her to do while traveling back home. Swelling started last night. She is currently a  and 35w1d. Age is 26. Pt denies any LOF or bleeding.  +FM

## 2023-11-03 ENCOUNTER — ROUTINE PRENATAL (OUTPATIENT)
Age: 33
End: 2023-11-03

## 2023-11-03 ENCOUNTER — TELEPHONE (OUTPATIENT)
Age: 33
End: 2023-11-03

## 2023-11-03 VITALS — DIASTOLIC BLOOD PRESSURE: 78 MMHG | SYSTOLIC BLOOD PRESSURE: 120 MMHG

## 2023-11-03 DIAGNOSIS — O36.8190 DECREASED FETAL MOVEMENT DURING PREGNANCY, ANTEPARTUM, SINGLE OR UNSPECIFIED FETUS: Primary | ICD-10-CM

## 2023-11-03 NOTE — PROGRESS NOTES
Pt concerned about vaginal leaking and decreased FM. Speculum- negative pool/ NTZ; precautions  NST procedure note    Marichuy Hernandez is a ,  26 y.o. female White (non-) whose Patient's last menstrual period was 2023 (approximate). was on  who presents for fetal non-stress test.    She is 35w5d and was monitored for 40 minutes and the FHR was reactive, with accelerations. NST Interpretation:    FHR baseline 135 bpm, variability moderate, accelerations present, decelerations Absent. Mild uterine irritability    Kati Wen was informed of the NST results and her questions were answered.

## 2023-11-03 NOTE — TELEPHONE ENCOUNTER
Called patient and verified identity. [facility] possible [facility] of membranes. Patient states she woke up and changed her underwear and will wear a pad today to monitor or any further leaking of fluid. Denies any more leaking of fluid today, vaginal bleeding, contractions, fevers, or decreased fetal movement. Instructed to contact me via [facility] today to be seen in office today if she shows signs of any of the above listed. Patient verbalized understanding.     Mikaela Marshall

## 2023-11-07 ENCOUNTER — ROUTINE PRENATAL (OUTPATIENT)
Age: 33
End: 2023-11-07

## 2023-11-07 VITALS — WEIGHT: 168 LBS | BODY MASS INDEX: 25.54 KG/M2 | SYSTOLIC BLOOD PRESSURE: 116 MMHG | DIASTOLIC BLOOD PRESSURE: 74 MMHG

## 2023-11-07 DIAGNOSIS — Z3A.14 14 WEEKS GESTATION OF PREGNANCY: Primary | ICD-10-CM

## 2023-11-07 DIAGNOSIS — O09.523 MULTIGRAVIDA OF ADVANCED MATERNAL AGE IN THIRD TRIMESTER: ICD-10-CM

## 2023-11-07 PROCEDURE — 0502F SUBSEQUENT PRENATAL CARE: CPT | Performed by: OBSTETRICS & GYNECOLOGY

## 2023-11-09 LAB
GP B STREP DNA SPEC QL NAA+PROBE: POSITIVE
SPECIMEN STATUS REPORT: NORMAL

## 2023-11-13 ENCOUNTER — ROUTINE PRENATAL (OUTPATIENT)
Age: 33
End: 2023-11-13

## 2023-11-13 VITALS — DIASTOLIC BLOOD PRESSURE: 78 MMHG | SYSTOLIC BLOOD PRESSURE: 118 MMHG | BODY MASS INDEX: 25.85 KG/M2 | WEIGHT: 170 LBS

## 2023-11-13 DIAGNOSIS — Z3A.14 14 WEEKS GESTATION OF PREGNANCY: Primary | ICD-10-CM

## 2023-11-13 PROCEDURE — 0502F SUBSEQUENT PRENATAL CARE: CPT | Performed by: OBSTETRICS & GYNECOLOGY

## 2023-11-13 NOTE — PROGRESS NOTES
Reviewed US and options of scheduled csection vs trial of version; Risks and benefits fully reviewed and all questions answered. Patient tearful throughout discussion; will update regarding decision  Ultrasound today. A SINGLE MICAELA BREECH 37W1D IUP IS SEEN. FETAL CARDIAC MOTION OBSERVED. LIMITED ANATOMY WAS VISUALIZED AND APPEARS WNL. Anterior placenta;  EFW= 6 LB 15 OZ ( 56 %) BPP=8/8 WENDY= 11.1 CM PLACENTA APPEARS WITHIN NORMAL LIMITS.

## 2023-11-14 ENCOUNTER — HOSPITAL ENCOUNTER (OUTPATIENT)
Facility: HOSPITAL | Age: 33
Discharge: HOME | End: 2023-11-14
Attending: OBSTETRICS & GYNECOLOGY | Admitting: OBSTETRICS & GYNECOLOGY
Payer: COMMERCIAL

## 2023-11-14 VITALS
OXYGEN SATURATION: 95 % | TEMPERATURE: 98.7 F | SYSTOLIC BLOOD PRESSURE: 127 MMHG | HEART RATE: 74 BPM | DIASTOLIC BLOOD PRESSURE: 74 MMHG | HEIGHT: 68 IN | BODY MASS INDEX: 25.76 KG/M2 | RESPIRATION RATE: 16 BRPM | WEIGHT: 170 LBS

## 2023-11-14 PROCEDURE — 59412 ANTEPARTUM MANIPULATION: CPT | Performed by: OBSTETRICS & GYNECOLOGY

## 2023-11-14 PROCEDURE — 59412 ANTEPARTUM MANIPULATION: CPT

## 2023-11-14 PROCEDURE — 59025 FETAL NON-STRESS TEST: CPT

## 2023-11-14 NOTE — PROGRESS NOTES
11/14/2023 11:54 AM  Received to LDR 2 for scheduled ECV. Consent signed. Pt refusing IV placement and terbutaline. Pt given education regarding both IV and terbutaline- still declines. 1226 Tracing reactive . Monitor off. Pt states she would like minimal conversation. Colleen states pt will use headset and black eye mask during version to assist with relaxation. 1230 Dr Marita Graham aware of pt's admission    1300 Deangelo Graham and Nicolle at bedside. All questions answered. 1306 Version began    1320 Version unsuccessful. 1325 EFM reapplied. Attempted to talk to pt regarding scheduling CS. Pt stated she did not want to talk about it today. 1426 Tracing reactive. Occ contraction noted. Pt unaware. Monitor off. Again, talked to pt about scheduling CS. Agrees to scheduled CS on 11/30 at noon. Dr Marita Graham aware. Discharged ambulatory to home- accompanied by spouse,  and mother. Discharge instructions given. All questions answered.
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
## 2023-11-14 NOTE — OP NOTE
Procedure note:    Preop dx:  Breech danay at 37plus weeks  Postop dx: same  Procedure: attempt at ECV  Providers:  Dr Carol Velasquez    Patient fully consented; fetal monitoring reassuring. Patient declined despite counseling terbutaline/ epidural.  See HPI  Three attempts at forward fetal roll attempted. Patient tearful despite  coaching/ support. Fetal heart tones 120-130s throughout. Despite adequate effort, version not successful. Fetal monitoring x1hour prior to discharge home  Lengthy counseling regarding options at this point.

## 2023-11-21 ENCOUNTER — ROUTINE PRENATAL (OUTPATIENT)
Age: 33
End: 2023-11-21

## 2023-11-21 VITALS — BODY MASS INDEX: 26.3 KG/M2 | DIASTOLIC BLOOD PRESSURE: 70 MMHG | WEIGHT: 173 LBS | SYSTOLIC BLOOD PRESSURE: 110 MMHG

## 2023-11-21 DIAGNOSIS — Z3A.14 14 WEEKS GESTATION OF PREGNANCY: ICD-10-CM

## 2023-11-21 DIAGNOSIS — Z34.90 PREGNANCY, UNSPECIFIED GESTATIONAL AGE: Primary | ICD-10-CM

## 2023-11-21 PROCEDURE — 0502F SUBSEQUENT PRENATAL CARE: CPT | Performed by: OBSTETRICS & GYNECOLOGY

## 2023-11-28 ENCOUNTER — ROUTINE PRENATAL (OUTPATIENT)
Age: 33
End: 2023-11-28

## 2023-11-28 VITALS — SYSTOLIC BLOOD PRESSURE: 118 MMHG | DIASTOLIC BLOOD PRESSURE: 78 MMHG | BODY MASS INDEX: 26.76 KG/M2 | WEIGHT: 176 LBS

## 2023-11-28 DIAGNOSIS — Z3A.39 39 WEEKS GESTATION OF PREGNANCY: Primary | ICD-10-CM

## 2023-11-28 PROCEDURE — 0502F SUBSEQUENT PRENATAL CARE: CPT | Performed by: OBSTETRICS & GYNECOLOGY

## 2023-11-28 NOTE — PROGRESS NOTES
Good fetal movement. No contractions.   Breech confirmed; feeling more comfortable with upcoming csection; all questions answered

## 2024-01-04 PROBLEM — Z3A.14 14 WEEKS GESTATION OF PREGNANCY: Status: RESOLVED | Noted: 2023-06-09 | Resolved: 2024-01-04

## 2024-08-10 ENCOUNTER — APPOINTMENT (OUTPATIENT)
Facility: HOSPITAL | Age: 34
End: 2024-08-10
Payer: COMMERCIAL

## 2024-08-10 ENCOUNTER — HOSPITAL ENCOUNTER (EMERGENCY)
Facility: HOSPITAL | Age: 34
Discharge: HOME | End: 2024-08-10
Attending: EMERGENCY MEDICINE
Payer: COMMERCIAL

## 2024-08-10 VITALS
DIASTOLIC BLOOD PRESSURE: 77 MMHG | RESPIRATION RATE: 18 BRPM | OXYGEN SATURATION: 96 % | SYSTOLIC BLOOD PRESSURE: 128 MMHG | TEMPERATURE: 98.1 F | HEART RATE: 70 BPM

## 2024-08-10 DIAGNOSIS — S29.9XXA INJURY OF LEFT BREAST, INITIAL ENCOUNTER: Primary | ICD-10-CM

## 2024-08-10 PROCEDURE — 76642 ULTRASOUND BREAST LIMITED: CPT

## 2024-08-10 PROCEDURE — 71046 X-RAY EXAM CHEST 2 VIEWS: CPT

## 2024-08-10 PROCEDURE — 99284 EMERGENCY DEPT VISIT MOD MDM: CPT

## 2024-08-10 PROCEDURE — 10030 IMG GID FLU COLL DRG SFT TIS: CPT

## 2024-08-10 PROCEDURE — 93005 ELECTROCARDIOGRAM TRACING: CPT | Performed by: EMERGENCY MEDICINE

## 2024-08-10 ASSESSMENT — PAIN DESCRIPTION - LOCATION: LOCATION: CHEST

## 2024-08-10 ASSESSMENT — PAIN - FUNCTIONAL ASSESSMENT
PAIN_FUNCTIONAL_ASSESSMENT: 0-10
PAIN_FUNCTIONAL_ASSESSMENT: ACTIVITIES ARE NOT PREVENTED

## 2024-08-10 ASSESSMENT — PAIN DESCRIPTION - PAIN TYPE: TYPE: ACUTE PAIN

## 2024-08-10 ASSESSMENT — PAIN DESCRIPTION - ONSET: ONSET: ON-GOING

## 2024-08-10 ASSESSMENT — PAIN DESCRIPTION - DESCRIPTORS: DESCRIPTORS: DISCOMFORT

## 2024-08-10 ASSESSMENT — PAIN DESCRIPTION - FREQUENCY: FREQUENCY: CONTINUOUS

## 2024-08-10 ASSESSMENT — PAIN SCALES - GENERAL: PAINLEVEL_OUTOF10: 4

## 2024-08-10 ASSESSMENT — PAIN DESCRIPTION - ORIENTATION: ORIENTATION: LEFT
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
## 2024-08-11 LAB
EKG ATRIAL RATE: 69 BPM
EKG DIAGNOSIS: NORMAL
EKG P AXIS: 79 DEGREES
EKG P-R INTERVAL: 162 MS
EKG Q-T INTERVAL: 404 MS
EKG QRS DURATION: 90 MS
EKG QTC CALCULATION (BAZETT): 432 MS
EKG R AXIS: 67 DEGREES
EKG T AXIS: 60 DEGREES
EKG VENTRICULAR RATE: 69 BPM

## 2025-06-27 ENCOUNTER — OFFICE VISIT (OUTPATIENT)
Age: 35
End: 2025-06-27
Payer: COMMERCIAL

## 2025-06-27 VITALS
HEART RATE: 76 BPM | WEIGHT: 153 LBS | HEIGHT: 68 IN | DIASTOLIC BLOOD PRESSURE: 74 MMHG | SYSTOLIC BLOOD PRESSURE: 114 MMHG | BODY MASS INDEX: 23.19 KG/M2

## 2025-06-27 DIAGNOSIS — E16.2 HYPOGLYCEMIA: Primary | ICD-10-CM

## 2025-06-27 PROCEDURE — 99213 OFFICE O/P EST LOW 20 MIN: CPT | Performed by: INTERNAL MEDICINE

## 2025-06-27 NOTE — PROGRESS NOTES
This is a 32-year-old woman referred for evaluation and management of hypoglycemia.  The patient states that she was in her usual state of health until 2019.  She relates the current symptoms to recurrent urinary tract infections in 2019 that resulted in multiple courses of antibiotics.  After that, she was getting on a plane in [place] and had an episode of severe hypoglycemia.  She has been troubled with hypoglycemic episodes ever since.  She saw a therapist in [place].  She also saw nutritionist in [place] who put her on probiotics.  She never got better.  She continues to have episodes which will be described below.  She then drove to [place] to be closer to family so that she might get help.     Regarding family history of diabetes, her uncle had diabetes.  Her mother has diabetes.  She has never been told that she has diabetes and she has never had diabetes with pregnancy.  She tells me her weight has been stable. However she weighed 141 pounds in March 2022. She previously was very physically active boxing 3-4 times a week for 60 minutes.  But because of the spell she stopped working out.   She has seen several endocrinologists in [place].      She was seen by Dr. Casandra Amin in December 2021.    She underwent an overnight fast.  At the completion of an 8-hour fast she presented to their office where she was found to have:  Glucose 83  C-peptide 2.0  Insulin 7.6  Cortisol 26.6  Glycohemoglobin 4.9     2. She saw Dr Jay Lopez at [facility] in March 2022.  CGM Device Name/Type: Freestyle Gerber 2  Date range of recording : 3/1/22-3/28/22  Download dated): 3/28/22  Provider analysis and interpretation: Average glucose 92 with variability 12.7%; 98% in target range of ; 2% low. Occasional with glucose levels in upper 60s. Lowest glucose was 52 occurring overnight without clear symptoms. No significant hyperglycemia. She apparently had an episode in the office with a simultaneous glucose of 107.   She